# Patient Record
Sex: FEMALE | Race: WHITE | NOT HISPANIC OR LATINO | Employment: OTHER | URBAN - METROPOLITAN AREA
[De-identification: names, ages, dates, MRNs, and addresses within clinical notes are randomized per-mention and may not be internally consistent; named-entity substitution may affect disease eponyms.]

---

## 2017-03-29 ENCOUNTER — HISTORICAL (OUTPATIENT)
Dept: RADIOLOGY | Facility: HOSPITAL | Age: 72
End: 2017-03-29

## 2017-04-04 ENCOUNTER — HISTORICAL (OUTPATIENT)
Dept: RADIOLOGY | Facility: HOSPITAL | Age: 72
End: 2017-04-04

## 2017-12-05 ENCOUNTER — HISTORICAL (OUTPATIENT)
Dept: RADIOLOGY | Facility: HOSPITAL | Age: 72
End: 2017-12-05

## 2018-03-21 ENCOUNTER — HISTORICAL (OUTPATIENT)
Dept: RADIOLOGY | Facility: HOSPITAL | Age: 73
End: 2018-03-21

## 2018-03-21 LAB — PTH-INTACT SERPL-MCNC: 121.5 PG/ML (ref 18.4–80.1)

## 2018-04-06 ENCOUNTER — HISTORICAL (OUTPATIENT)
Dept: RADIOLOGY | Facility: HOSPITAL | Age: 73
End: 2018-04-06

## 2018-04-30 ENCOUNTER — HISTORICAL (OUTPATIENT)
Dept: SURGERY | Facility: HOSPITAL | Age: 73
End: 2018-04-30

## 2018-04-30 LAB
ABS NEUT (OLG): 3.7 X10(3)/MCL (ref 1.5–6.9)
ALBUMIN SERPL-MCNC: 4.2 GM/DL (ref 3.4–5)
ALBUMIN/GLOB SERPL: 1 RATIO
ALP SERPL-CCNC: 49 UNIT/L (ref 30–113)
ALT SERPL-CCNC: 30 UNIT/L (ref 10–45)
APTT PPP: 23.9 SECOND(S) (ref 25–35)
AST SERPL-CCNC: 20 UNIT/L (ref 15–37)
BILIRUB SERPL-MCNC: 0.5 MG/DL (ref 0.1–0.9)
BILIRUBIN DIRECT+TOT PNL SERPL-MCNC: 0.1 MG/DL (ref 0–0.3)
BILIRUBIN DIRECT+TOT PNL SERPL-MCNC: 0.4 MG/DL
BUN SERPL-MCNC: 21 MG/DL (ref 10–20)
CALCIUM SERPL-MCNC: 10.5 MG/DL (ref 8–10.5)
CHLORIDE SERPL-SCNC: 100 MMOL/L (ref 100–108)
CO2 SERPL-SCNC: 31 MMOL/L (ref 21–35)
CREAT SERPL-MCNC: 0.9 MG/DL (ref 0.7–1.3)
ERYTHROCYTE [DISTWIDTH] IN BLOOD BY AUTOMATED COUNT: 13.2 % (ref 11.5–17)
GLOBULIN SER-MCNC: 4.3 GM/DL
GLUCOSE SERPL-MCNC: 93 MG/DL (ref 75–116)
HCT VFR BLD AUTO: 37.1 % (ref 36–48)
HGB BLD-MCNC: 12.6 GM/DL (ref 12–16)
INR PPP: 1 (ref 0–1.2)
MCH RBC QN AUTO: 28 PG (ref 27–34)
MCHC RBC AUTO-ENTMCNC: 34 GM/DL (ref 31–36)
MCV RBC AUTO: 84 FL (ref 80–99)
PLATELET # BLD AUTO: 265 X10(3)/MCL (ref 140–400)
PMV BLD AUTO: 10.6 FL (ref 6.8–10)
POTASSIUM SERPL-SCNC: 3.6 MMOL/L (ref 3.6–5.2)
PROT SERPL-MCNC: 8.5 GM/DL (ref 6.4–8.2)
PROTHROMBIN TIME: 10.6 SECOND(S) (ref 9–12)
RBC # BLD AUTO: 4.42 X10(6)/MCL (ref 4.2–5.4)
SODIUM SERPL-SCNC: 140 MMOL/L (ref 135–145)
WBC # SPEC AUTO: 6.2 X10(3)/MCL (ref 4.5–11.5)

## 2018-05-03 ENCOUNTER — HISTORICAL (OUTPATIENT)
Dept: ANESTHESIOLOGY | Facility: HOSPITAL | Age: 73
End: 2018-05-03

## 2018-10-04 ENCOUNTER — HISTORICAL (OUTPATIENT)
Dept: RADIOLOGY | Facility: HOSPITAL | Age: 73
End: 2018-10-04

## 2018-10-15 ENCOUNTER — HISTORICAL (OUTPATIENT)
Dept: INFUSION THERAPY | Facility: HOSPITAL | Age: 73
End: 2018-10-15

## 2018-10-30 LAB — MAGNESIUM SERPL-MCNC: 1.3 MG/DL (ref 1.8–2.4)

## 2018-10-31 LAB — MAGNESIUM SERPL-MCNC: 1.4 MG/DL (ref 1.8–2.4)

## 2018-11-01 LAB
ABS NEUT (OLG): 7.86
BASOPHILS # BLD AUTO: 0.02 X10(3)/MCL
BASOPHILS NFR BLD AUTO: 0.2 %
BUN SERPL-MCNC: 14 MG/DL (ref 7–18)
CALCIUM SERPL-MCNC: 7.5 MG/DL (ref 8.5–10.1)
CHLORIDE SERPL-SCNC: 103 MMOL/L (ref 98–107)
CO2 SERPL-SCNC: 27.5 MMOL/L (ref 21–32)
CREAT SERPL-MCNC: 1.09 MG/DL (ref 0.55–1.02)
CREAT/UREA NIT SERPL: 13
EOSINOPHIL # BLD AUTO: 0.24 X10(3)/MCL
EOSINOPHIL NFR BLD AUTO: 2.4 %
ERYTHROCYTE [DISTWIDTH] IN BLOOD BY AUTOMATED COUNT: 14 %
GLUCOSE SERPL-MCNC: 116 MG/DL (ref 74–106)
HCT VFR BLD AUTO: 29.4 % (ref 34–46)
HGB BLD-MCNC: 10.1 GM/DL (ref 11.3–15.4)
IMM GRANULOCYTES # BLD AUTO: 0.07 10*3/UL (ref 0–0.1)
IMM GRANULOCYTES NFR BLD AUTO: 0.7 % (ref 0–1)
LYMPHOCYTES # BLD AUTO: 1.13 X10(3)/MCL
LYMPHOCYTES NFR BLD AUTO: 11.1 %
MAGNESIUM SERPL-MCNC: 1.8 MG/DL (ref 1.8–2.4)
MCH RBC QN AUTO: 28.5 PG (ref 27–33)
MCHC RBC AUTO-ENTMCNC: 34.4 GM/DL (ref 32–35)
MCV RBC AUTO: 82.8 FL (ref 81–97)
MONOCYTES # BLD AUTO: 0.84 X10(3)/MCL
MONOCYTES NFR BLD AUTO: 8.3 %
NEUTROPHILS # BLD AUTO: 7.86 X10(3)/MCL
NEUTROPHILS NFR BLD AUTO: 77.3 %
PLATELET # BLD AUTO: 308 X10(3)/MCL (ref 151–368)
PMV BLD AUTO: 9 FL
POTASSIUM SERPL-SCNC: 3.7 MMOL/L (ref 3.5–5.1)
RBC # BLD AUTO: 3.55 X10(6)/MCL (ref 3.9–5)
SODIUM SERPL-SCNC: 138 MMOL/L (ref 136–145)
WBC # SPEC AUTO: 10.16 X10(3)/MCL (ref 3.4–9.2)

## 2018-11-02 ENCOUNTER — HISTORICAL (OUTPATIENT)
Dept: ADMINISTRATIVE | Facility: HOSPITAL | Age: 73
End: 2018-11-02

## 2018-11-02 LAB
ABS NEUT (OLG): 6.86
BASOPHILS # BLD AUTO: 0.03 X10(3)/MCL
BASOPHILS NFR BLD AUTO: 0.3 %
BUN SERPL-MCNC: 13 MG/DL (ref 7–18)
CALCIUM SERPL-MCNC: 7.6 MG/DL (ref 8.5–10.1)
CHLORIDE SERPL-SCNC: 102 MMOL/L (ref 98–107)
CO2 SERPL-SCNC: 26.8 MMOL/L (ref 21–32)
CREAT SERPL-MCNC: 0.9 MG/DL (ref 0.55–1.02)
CREAT/UREA NIT SERPL: 14
EOSINOPHIL # BLD AUTO: 0.28 X10(3)/MCL
EOSINOPHIL NFR BLD AUTO: 3 %
ERYTHROCYTE [DISTWIDTH] IN BLOOD BY AUTOMATED COUNT: 14 %
GLUCOSE SERPL-MCNC: 107 MG/DL (ref 74–106)
HCT VFR BLD AUTO: 29.7 % (ref 34–46)
HGB BLD-MCNC: 10.2 GM/DL (ref 11.3–15.4)
IMM GRANULOCYTES # BLD AUTO: 0.06 10*3/UL (ref 0–0.1)
IMM GRANULOCYTES NFR BLD AUTO: 0.6 % (ref 0–1)
LYMPHOCYTES # BLD AUTO: 1.32 X10(3)/MCL
LYMPHOCYTES NFR BLD AUTO: 14.1 %
MAGNESIUM SERPL-MCNC: 1.6 MG/DL (ref 1.8–2.4)
MCH RBC QN AUTO: 28.4 PG (ref 27–33)
MCHC RBC AUTO-ENTMCNC: 34.3 GM/DL (ref 32–35)
MCV RBC AUTO: 82.7 FL (ref 81–97)
MONOCYTES # BLD AUTO: 0.82 X10(3)/MCL
MONOCYTES NFR BLD AUTO: 8.8 %
NEUTROPHILS # BLD AUTO: 6.86 X10(3)/MCL
NEUTROPHILS NFR BLD AUTO: 73.2 %
PLATELET # BLD AUTO: 311 X10(3)/MCL (ref 151–368)
PMV BLD AUTO: 9 FL
POTASSIUM SERPL-SCNC: 3.1 MMOL/L (ref 3.5–5.1)
RBC # BLD AUTO: 3.59 X10(6)/MCL (ref 3.9–5)
SODIUM SERPL-SCNC: 138 MMOL/L (ref 136–145)
WBC # SPEC AUTO: 9.37 X10(3)/MCL (ref 3.4–9.2)

## 2018-11-03 LAB
ABS NEUT (OLG): 5.57
BASOPHILS # BLD AUTO: 0.04 X10(3)/MCL
BASOPHILS NFR BLD AUTO: 0.5 %
BUN SERPL-MCNC: 12 MG/DL (ref 7–18)
CALCIUM SERPL-MCNC: 7.9 MG/DL (ref 8.5–10.1)
CHLORIDE SERPL-SCNC: 101 MMOL/L (ref 98–107)
CO2 SERPL-SCNC: 27.3 MMOL/L (ref 21–32)
CREAT SERPL-MCNC: 0.78 MG/DL (ref 0.55–1.02)
CREAT/UREA NIT SERPL: 15
EOSINOPHIL # BLD AUTO: 0.17 X10(3)/MCL
EOSINOPHIL NFR BLD AUTO: 2.2 %
ERYTHROCYTE [DISTWIDTH] IN BLOOD BY AUTOMATED COUNT: 14 %
GLUCOSE SERPL-MCNC: 104 MG/DL (ref 74–106)
HCT VFR BLD AUTO: 30.6 % (ref 34–46)
HGB BLD-MCNC: 10.5 GM/DL (ref 11.3–15.4)
IMM GRANULOCYTES # BLD AUTO: 0.05 10*3/UL (ref 0–0.1)
IMM GRANULOCYTES NFR BLD AUTO: 0.6 % (ref 0–1)
LYMPHOCYTES # BLD AUTO: 1.15 X10(3)/MCL
LYMPHOCYTES NFR BLD AUTO: 14.9 %
MAGNESIUM SERPL-MCNC: 2 MG/DL (ref 1.8–2.4)
MCH RBC QN AUTO: 28.3 PG (ref 27–33)
MCHC RBC AUTO-ENTMCNC: 34.3 GM/DL (ref 32–35)
MCV RBC AUTO: 82.5 FL (ref 81–97)
MONOCYTES # BLD AUTO: 0.74 X10(3)/MCL
MONOCYTES NFR BLD AUTO: 9.6 %
NEUTROPHILS # BLD AUTO: 5.57 X10(3)/MCL
NEUTROPHILS NFR BLD AUTO: 72.2 %
PLATELET # BLD AUTO: 298 X10(3)/MCL (ref 151–368)
PMV BLD AUTO: 10 FL
POTASSIUM SERPL-SCNC: 3.5 MMOL/L (ref 3.5–5.1)
RBC # BLD AUTO: 3.71 X10(6)/MCL (ref 3.9–5)
SODIUM SERPL-SCNC: 136 MMOL/L (ref 136–145)
WBC # SPEC AUTO: 7.72 X10(3)/MCL (ref 3.4–9.2)

## 2018-11-06 LAB
ABS NEUT (OLG): 4.15
BASOPHILS # BLD AUTO: 0.04 X10(3)/MCL
BASOPHILS NFR BLD AUTO: 0.7 %
BUN SERPL-MCNC: 23 MG/DL (ref 7–18)
CALCIUM SERPL-MCNC: 9.1 MG/DL (ref 8.5–10.1)
CHLORIDE SERPL-SCNC: 100 MMOL/L (ref 98–107)
CO2 SERPL-SCNC: 27.4 MMOL/L (ref 21–32)
CREAT SERPL-MCNC: 0.88 MG/DL (ref 0.55–1.02)
CREAT/UREA NIT SERPL: 26
EOSINOPHIL # BLD AUTO: 0.11 X10(3)/MCL
EOSINOPHIL NFR BLD AUTO: 1.8 %
ERYTHROCYTE [DISTWIDTH] IN BLOOD BY AUTOMATED COUNT: 14 %
GLUCOSE SERPL-MCNC: 112 MG/DL (ref 74–106)
HCT VFR BLD AUTO: 30.8 % (ref 34–46)
HGB BLD-MCNC: 10.5 GM/DL (ref 11.3–15.4)
IMM GRANULOCYTES # BLD AUTO: 0.02 10*3/UL (ref 0–0.1)
IMM GRANULOCYTES NFR BLD AUTO: 0.3 % (ref 0–1)
LYMPHOCYTES # BLD AUTO: 0.94 X10(3)/MCL
LYMPHOCYTES NFR BLD AUTO: 15.7 %
MAGNESIUM SERPL-MCNC: 1.5 MG/DL (ref 1.8–2.4)
MCH RBC QN AUTO: 28.1 PG (ref 27–33)
MCHC RBC AUTO-ENTMCNC: 34.1 GM/DL (ref 32–35)
MCV RBC AUTO: 82.4 FL (ref 81–97)
MONOCYTES # BLD AUTO: 0.73 X10(3)/MCL
MONOCYTES NFR BLD AUTO: 12.2 %
NEUTROPHILS # BLD AUTO: 4.15 X10(3)/MCL
NEUTROPHILS NFR BLD AUTO: 69.3 %
PLATELET # BLD AUTO: 292 X10(3)/MCL (ref 151–368)
PMV BLD AUTO: 10 FL
POTASSIUM SERPL-SCNC: 3.4 MMOL/L (ref 3.5–5.1)
RBC # BLD AUTO: 3.74 X10(6)/MCL (ref 3.9–5)
SODIUM SERPL-SCNC: 137 MMOL/L (ref 136–145)
WBC # SPEC AUTO: 5.99 X10(3)/MCL (ref 3.4–9.2)

## 2018-11-07 LAB
ABS NEUT (OLG): 2.83
BUN SERPL-MCNC: 22 MG/DL (ref 7–18)
CALCIUM SERPL-MCNC: 9.9 MG/DL (ref 8.5–10.1)
CHLORIDE SERPL-SCNC: 99 MMOL/L (ref 98–107)
CO2 SERPL-SCNC: 30 MMOL/L (ref 21–32)
CREAT SERPL-MCNC: 1.05 MG/DL (ref 0.55–1.02)
CREAT/UREA NIT SERPL: 21
EOSINOPHIL NFR BLD MANUAL: 1 % (ref 0–8)
ERYTHROCYTE [DISTWIDTH] IN BLOOD BY AUTOMATED COUNT: 14 %
GLUCOSE SERPL-MCNC: 111 MG/DL (ref 74–106)
GRANULOCYTES NFR BLD MANUAL: 65 % (ref 47–80)
HCT VFR BLD AUTO: 30.9 % (ref 34–46)
HGB BLD-MCNC: 10.6 GM/DL (ref 11.3–15.4)
LYMPHOCYTES NFR BLD MANUAL: 25 % (ref 13–40)
MAGNESIUM SERPL-MCNC: 1.4 MG/DL (ref 1.8–2.4)
MCH RBC QN AUTO: 28.1 PG (ref 27–33)
MCHC RBC AUTO-ENTMCNC: 34.3 GM/DL (ref 32–35)
MCV RBC AUTO: 82 FL (ref 81–97)
MONOCYTES NFR BLD MANUAL: 9 % (ref 2–11)
PLATELET # BLD AUTO: 277 X10(3)/MCL (ref 151–368)
PLATELET # BLD EST: ADEQUATE 10*3/UL
PMV BLD AUTO: 10 FL
POTASSIUM SERPL-SCNC: 3.8 MMOL/L (ref 3.5–5.1)
RBC # BLD AUTO: 3.77 X10(6)/MCL (ref 3.9–5)
SODIUM SERPL-SCNC: 136 MMOL/L (ref 136–145)
WBC # SPEC AUTO: 4.69 X10(3)/MCL (ref 3.4–9.2)

## 2018-11-08 LAB
ABS NEUT (OLG): 1.89
BUN SERPL-MCNC: 25 MG/DL (ref 7–18)
CALCIUM SERPL-MCNC: 9.7 MG/DL (ref 8.5–10.1)
CHLORIDE SERPL-SCNC: 101 MMOL/L (ref 98–107)
CO2 SERPL-SCNC: 31.9 MMOL/L (ref 21–32)
CREAT SERPL-MCNC: 0.88 MG/DL (ref 0.55–1.02)
CREAT/UREA NIT SERPL: 28
EOSINOPHIL NFR BLD MANUAL: 2 % (ref 0–8)
ERYTHROCYTE [DISTWIDTH] IN BLOOD BY AUTOMATED COUNT: 14 %
GLUCOSE SERPL-MCNC: 106 MG/DL (ref 74–106)
GRANULOCYTES NFR BLD MANUAL: 64 % (ref 47–80)
HCT VFR BLD AUTO: 30.6 % (ref 34–46)
HGB BLD-MCNC: 10.4 GM/DL (ref 11.3–15.4)
LYMPHOCYTES NFR BLD MANUAL: 24 % (ref 13–40)
MAGNESIUM SERPL-MCNC: 1.3 MG/DL (ref 1.8–2.4)
MCH RBC QN AUTO: 27.9 PG (ref 27–33)
MCHC RBC AUTO-ENTMCNC: 34 GM/DL (ref 32–35)
MCV RBC AUTO: 82 FL (ref 81–97)
MONOCYTES NFR BLD MANUAL: 10 % (ref 2–11)
PLATELET # BLD AUTO: 279 X10(3)/MCL (ref 151–368)
PLATELET # BLD EST: ADEQUATE 10*3/UL
PMV BLD AUTO: 10 FL
POTASSIUM SERPL-SCNC: 3.8 MMOL/L (ref 3.5–5.1)
RBC # BLD AUTO: 3.73 X10(6)/MCL (ref 3.9–5)
SODIUM SERPL-SCNC: 138 MMOL/L (ref 136–145)
WBC # SPEC AUTO: 4 X10(3)/MCL (ref 3.4–9.2)

## 2018-12-27 ENCOUNTER — HISTORICAL (OUTPATIENT)
Dept: LAB | Facility: HOSPITAL | Age: 73
End: 2018-12-27

## 2018-12-27 LAB
FLUAV AG NPH QL IA: POSITIVE
FLUBV AG NPH QL IA: NEGATIVE

## 2019-04-16 ENCOUNTER — HISTORICAL (OUTPATIENT)
Dept: INFUSION THERAPY | Facility: HOSPITAL | Age: 74
End: 2019-04-16

## 2019-10-10 ENCOUNTER — HISTORICAL (OUTPATIENT)
Dept: RADIOLOGY | Facility: HOSPITAL | Age: 74
End: 2019-10-10

## 2019-10-16 ENCOUNTER — HISTORICAL (OUTPATIENT)
Dept: INFUSION THERAPY | Facility: HOSPITAL | Age: 74
End: 2019-10-16

## 2019-12-26 ENCOUNTER — HISTORICAL (OUTPATIENT)
Dept: ADMINISTRATIVE | Facility: HOSPITAL | Age: 74
End: 2019-12-26

## 2019-12-26 LAB
APPEARANCE, UA: CLEAR
BILIRUB UR QL STRIP: NEGATIVE
COLOR UR: YELLOW
GLUCOSE (UA): NEGATIVE
HGB UR QL STRIP: NEGATIVE
KETONES UR QL STRIP: NEGATIVE
LEUKOCYTE ESTERASE UR QL STRIP: NEGATIVE
NITRITE UR QL STRIP: NEGATIVE
PH UR STRIP: 6 [PH]
PROT UR QL STRIP: NEGATIVE
SP GR UR STRIP: 1.02
UROBILINOGEN UR STRIP-ACNC: 0.2 EU/DL

## 2020-09-16 ENCOUNTER — HISTORICAL (OUTPATIENT)
Dept: ADMINISTRATIVE | Facility: HOSPITAL | Age: 75
End: 2020-09-16

## 2020-09-16 LAB — LACTATE SERPL-SCNC: 1 MMOL/L (ref 0.5–2.2)

## 2020-09-25 ENCOUNTER — HISTORICAL (OUTPATIENT)
Dept: INFUSION THERAPY | Facility: HOSPITAL | Age: 75
End: 2020-09-25

## 2020-10-12 ENCOUNTER — HISTORICAL (OUTPATIENT)
Dept: RADIOLOGY | Facility: HOSPITAL | Age: 75
End: 2020-10-12

## 2021-03-26 ENCOUNTER — HISTORICAL (OUTPATIENT)
Dept: INFUSION THERAPY | Facility: HOSPITAL | Age: 76
End: 2021-03-26

## 2021-09-18 ENCOUNTER — HISTORICAL (OUTPATIENT)
Dept: ADMINISTRATIVE | Facility: HOSPITAL | Age: 76
End: 2021-09-18

## 2021-09-18 LAB
ABS NEUT (OLG): 8.9 X10(3)/MCL (ref 1.5–6.9)
ALBUMIN SERPL-MCNC: 3.9 GM/DL (ref 3.4–4.8)
ALBUMIN/GLOB SERPL: 1 RATIO (ref 1.1–2)
ALP SERPL-CCNC: 59 UNIT/L (ref 40–150)
ALT SERPL-CCNC: 23 UNIT/L (ref 0–55)
AST SERPL-CCNC: 20 UNIT/L (ref 5–34)
BILIRUB SERPL-MCNC: 0.6 MG/DL
BILIRUBIN DIRECT+TOT PNL SERPL-MCNC: 0.3 MG/DL (ref 0–0.5)
BILIRUBIN DIRECT+TOT PNL SERPL-MCNC: 0.3 MG/DL (ref 0–0.8)
BUN SERPL-MCNC: 41 MG/DL (ref 9.8–20.1)
CALCIUM SERPL-MCNC: 11 MG/DL (ref 8.4–10.2)
CHLORIDE SERPL-SCNC: 101 MMOL/L (ref 98–107)
CO2 SERPL-SCNC: 23 MMOL/L (ref 23–31)
CREAT SERPL-MCNC: 0.95 MG/DL (ref 0.55–1.02)
ERYTHROCYTE [DISTWIDTH] IN BLOOD BY AUTOMATED COUNT: 15.6 % (ref 11.5–17)
GLOBULIN SER-MCNC: 3.9 GM/DL (ref 2.4–3.5)
GLUCOSE SERPL-MCNC: 99 MG/DL (ref 82–115)
HCT VFR BLD AUTO: 33.3 % (ref 36–48)
HGB BLD-MCNC: 11.3 GM/DL (ref 12–16)
MCH RBC QN AUTO: 28 PG (ref 27–34)
MCHC RBC AUTO-ENTMCNC: 34 GM/DL (ref 31–36)
MCV RBC AUTO: 81 FL (ref 80–99)
PLATELET # BLD AUTO: 230 X10(3)/MCL (ref 140–400)
PMV BLD AUTO: 10 FL (ref 6.8–10)
POTASSIUM SERPL-SCNC: 4.3 MMOL/L (ref 3.5–5.1)
PROT SERPL-MCNC: 7.8 GM/DL (ref 5.8–7.6)
RBC # BLD AUTO: 4.1 X10(6)/MCL (ref 4.2–5.4)
SODIUM SERPL-SCNC: 140 MMOL/L (ref 136–145)
WBC # SPEC AUTO: 11.4 X10(3)/MCL (ref 4.5–11.5)

## 2021-09-23 ENCOUNTER — HISTORICAL (OUTPATIENT)
Dept: INFUSION THERAPY | Facility: HOSPITAL | Age: 76
End: 2021-09-23

## 2022-03-25 ENCOUNTER — HISTORICAL (OUTPATIENT)
Dept: RADIOLOGY | Facility: HOSPITAL | Age: 77
End: 2022-03-25

## 2022-03-25 ENCOUNTER — HISTORICAL (OUTPATIENT)
Dept: ADMINISTRATIVE | Facility: HOSPITAL | Age: 77
End: 2022-03-25

## 2022-04-14 ENCOUNTER — HISTORICAL (OUTPATIENT)
Dept: INFUSION THERAPY | Facility: HOSPITAL | Age: 77
End: 2022-04-14

## 2022-04-30 NOTE — OP NOTE
PROCEDURE:  Colonoscopy.    PREOPERATIVE DIAGNOSIS:  Fecal abnormality (positive Cologuard test).    POSTOPERATIVE DIAGNOSES:    1. A 2-3 mm polyp of the proximal transverse colon, hot biopsy removal piecemeal.  2. Hot snare polypectomy at 40 cm of the distal descending.    HISTORY OF PRESENT ILLNESS:  A 73-year-old white female with a positive Cologuard.  She denies any other alarming symptoms.  We are pursuing a Cologuard considering its findings.     She was consented for the procedure with her nephew, Mr. __________, who assists her.  Otherwise, she was consentable at the time.  Note his witness to that as well as my nurse, Angy.  She was agreeable to the procedure and was willing to undergo the risks and benefits of the procedure.  Those were explained to her in my office.    PROCEDURE IN DETAIL:  She was brought down to the endoscopy room suite.  At that point, the patient relayed to the endoscopy team, that she was unable to lay on her left side long because of severe crystal induced dizziness.  I told her that we would use propofol, which would prevent any neuro proprioceptive capacity and we explained this to her and she was amenable to that.  She was placed on her right side initially.  Anesthesia was given per the IV of propofol.  Please see their records.  She was turned to her left lateral decubitus and we began the case.     Rectal exam was performed.  She had no internal and no external abnormalities.  The scope was navigated all the way to the right-sided colon.       She had a suboptimal prep.  I was able to see the cecum and the terminal ileal orifice, but I could not intubate it due to the laxity of her colon, but again, the prep was suboptimal, but I could see that she had no obvious masses on the right side.  Of note, she was complaining of right-sided pain.  There were no diverticular issues.  No signs of any mucosal changes on the right side.  Then, 360 degree circumferential views  were taken of the right-side in the hepatic flexure and transverse colon.     In the transverse colon, there was a small 2-3 mm polyp that I removed with a hot snare.  Electrocautery setting of 6.  No perforation, no bleed.  Sent off in jar #1.     The rest of the transverse colon was within normal limits.  No mucosal changes.  No masses.  No polyps.  The same applies to the splenic flexure.     The descending colon, at 40 cm, showed a small 0.75 mm polyp that was amenable to hot snare and removal.  It was sent off in jar #2.  No perforation.  No bleed.  Electrocautery setting of 6.     The scope was pulled down.  The rest of the sigmoid and rectum in retroflexion showed no internal abnormalities.     In summary, a 73-year-old white female with hot biopsy piecemeal removal of a subcentimeter polyp in the proximal transverse.     Hot snare polypectomy of a subcentimeter polyp at the distal descending.     We will follow up on these biopsies closely and I would discuss with Dr. Vargas other etiologies to her right upper quadrant pain (duodenal, gastric, gallbladder) if she still has that, etc.  Also, would consider costal pain as well because she also can be constipated at times, which is very possible, considering tortuosity of her colon.  I will try cathartics and then if we were to pursue upper endoscopy as a last resort.     I will follow along with Dr. Vargas.  We will discuss the case as needed.       Thank you note to her for allowing me to participate in the care of her patients as usual.        JOSE DANIEL/GENOVEVA   DD: 05/03/2018 1119   DT: 05/03/2018 1147  Job # [id number]    cc: MERARY Almodovar III, M.D.

## 2022-05-03 NOTE — HISTORICAL [facility] CERNER
This is a historical note converted from Andres. Formatting and pictures may have been removed.  Please reference Andres for original formatting and attached multimedia. History of Present Illness  74yo female admitted to [facility] for therapy and rehab.? She was recently at [facility] where she was admitted for UTI, colitis, weakness and falls.? The family member that she lives with requested SNF placement.? She was not able to go to [facility] yet due to a level 2.? Otherwise she is well.? She is c/o some nausea today but no vomiting.? She will be resumed on her current hospital meds and we will get PT/OT involved in her care.  Review of Systems  ?  ?????Constitutional: ?No fever, No chills, No sweats,?+ weakness, No fatigue. ?  ?????Eye: ?No double vision, No dry eyes, No photophobia. ?  ?????Ear/Nose/Mouth/Throat: ?No ear pain, No nasal congestion, No sore throat. ?  ?????Respiratory: ?No shortness of breath, No cough, No sputum production, No hemoptysis, No wheezing, No pleuritic pain. ?  ?????Cardiovascular: ?No chest pain, No palpitations, No peripheral edema, No syncope. ?  ?????Gastrointestinal:??+ nausea, No vomiting, No diarrhea, No constipation, No heartburn, No abdominal pain. ?  ?????Genitourinary: ?No dysuria, No hematuria, No change in urine stream. ?  ?????Hematology/Lymphatics: ?No anemia, No bruising tendency, No bruise, No hemorrhage, No petechiae. ?  ?????Endocrine: ?No excessive thirst, No polyuria, No cold intolerance. ?  ?????Immunologic: ?No recurrent fevers, No recurrent infections. ?  ?????Musculoskeletal: ?Joint pain,?+ back pain, No muscle pain, No decreased range of motion. ?  ?????Integumentary: ?No rash, No pruritus, No petechiae, No skin lesion. ?  ?????Neurologic: ?Alert and oriented X4, No abnormal balance,?+ confusion, No tingling. ?  ?????Psychiatric: ?No anxiety, No depression. ?  Physical Exam  Vitals & Measurements  T:?36.7? ?C (Oral)? TMIN:?36.5? ?C (Oral)? TMAX:?36.9? ?C  (Oral)? HR:?82(Peripheral)? RR:?18? BP:?114/74? SpO2:?97%? WT:?91?kg? WT:?91?kg?  General: ?Alert and oriented, No acute distress. ?  ??????? ? Appearance: Well nourished. ?  ??????? ? Behavior: Appropriate. ?  ??????? ? Skin: Normal for ethnicity. ?  ?????Eye: ?Pupils are equal, round and reactive to light, Extraocular movements are intact. ?  ?????HENT: ?Normocephalic, Normal hearing, Oral mucosa is moist, No pharyngeal erythema. ?  ?????Neck: ?Supple, Non-tender, No carotid bruit, No jugular venous distention, No lymphadenopathy, No thyromegaly. ?  ?????Respiratory: ?Lungs are clear to auscultation, Breath sounds are equal, No chest wall tenderness. ?  ?????Cardiovascular: ?Normal rate, Regular rhythm, No murmur, No gallop, Good pulses equal in all extremities, Normal peripheral perfusion, No edema. ?  ?????Gastrointestinal: ?Soft, Non-distended, Normal bowel sounds, No organomegaly. ?obese, mild TTP which is diffuse  ?????Genitourinary: ?No costovertebral angle tenderness, No urethral discharge. ?  ?????Lymphatics: ?No lymphadenopathy neck, axilla, groin. ?  ?????Musculoskeletal: ?Normal range of motion, Normal strength, No tenderness, No swelling, Normal gait. ?  ?????Integumentary: ?Warm, Dry, Pink, Intact, No rash. ?  ?????Neurologic: ?Alert, Oriented, Normal sensory, Normal motor function, No focal deficits, Cranial Nerves II-XII are grossly intact. ?  ?????Psychiatric: ?Cooperative, Appropriate mood & affect, Normal judgment. ?  Assessment/Plan  1.?UTI - Urinary tract infection  2.?HTN - Hypertension  3.?Hyperlipidemia  4.?Colitis  Orders:  clonazePAM, 0.25 mg, form: Tab, Oral, Daily, first dose 10/31/18 9:00:00 CDT  clonazePAM, 0.5 mg, form: Tab, Oral, qPM, first dose 10/31/18 21:00:00 CDT  DULoxetine, 60 mg, form: Cap-DR, Oral, qPM, first dose 10/31/18 21:00:00 CDT  enoxaparin, 30 mg, form: Injection, Subcutaneous, Once a day (at bedtime), first dose 10/31/18 21:00:00 CDT  hydrochlorothiazide, 25 mg,  form: Tab, Oral, qAM, first dose 10/31/18 9:00:00 CDT  levoFLOXacin, 750 mg, form: Infusion, IV Piggyback, q48hr, Infuse over: 1.5 hr, first dose 10/31/18 9:00:00 CDT  losartan, 25 mg, form: Tab, Oral, Daily, first dose 10/31/18 9:00:00 CDT  magnesium sulfate, 2 gm, form: Infusion, IV Piggyback, Once, Infuse over: 2 hr, first dose 10/31/18 9:43:00 CDT, stop date 10/31/18 9:43:00 CDT  metroNIDAZOLE, 500 mg, form: Infusion, IV Piggyback, q8hr, Infuse over: 1 hr, first dose 10/31/18 9:00:00 CDT  ondansetron, 4 mg, form: Injection, IV Push, q4hr PRN for nausea, first dose 10/31/18 13:16:00 CDT  pantoprazole, 40 mg, form: Tab-EC, Oral, Daily, first dose 10/31/18 9:00:00 CDT  potassium chloride, 20 mEq, form: Tab-ER, Oral, Daily, first dose 10/31/18 9:00:00 CDT  primidone, 25 mg, form: Tab, Oral, BID, first dose 10/31/18 9:00:00 CDT  QUEtiapine, 12.5 mg, form: Tab, Oral, At Bedtime, first dose 10/31/18 21:00:00 CDT  simvastatin, 40 mg, form: Tab, Oral, qPM, first dose 10/31/18 21:00:00 CDT  traMADol, 50 mg, form: Tab, Oral, q6hr PRN for pain, first dose 10/31/18 8:42:00 CDT  [facility]  [facility]  DVT prophylaxis  resume current transfer meds  replace Mag IV  consult PT/OT   Problem List/Past Medical History  Ongoing  Back pain  Depression  HTN - Hypertension  Hyperlipidemia  Obesity  UTI - Urinary tract infection  Historical  No qualifying data  Procedure/Surgical History  Biopsy Gastrointestinal (05/03/2018)  Colonoscopy (05/03/2018)  Polypectomy (05/03/2018)  Cholecystectomy Laparoscopic (None) (06/15/2016)  Appendectomy;  Colonoscopy, flexible; with biopsy, single or multiple  Total abdominal hysterectomy (corpus and cervix), with or without removal of tube(s), with or without removal of ovary(s);   Medications  Inpatient  clonazePAM, 0.25 mg= 0.5 tab(s), Oral, Daily  clonazePAM, 0.5 mg= 1 tab(s), Oral, qPM  DULoxetine 60 mg oral delayed release capsule, 60 mg= 1 cap(s), Oral, qPM  hydrochlorothiazide 25 mg  oral tablet, 25 mg= 1 tab(s), Oral, qAM  levofloxacin IVPB ( Levaquin ), 750 mg= 150 mL, IV Piggyback, q48hr  losartan 50 mg oral tablet, 25 mg= 0.5 tab(s), Oral, Daily  Lovenox 30 mg/0.3 mL subcutaneous solution, 30 mg= 0.3 mL, Subcutaneous, Once a day (at bedtime)  Magnesium Sulfate 2gm/50ml IVPB (Premix), 2 gm= 50 mL, IV Piggyback, Once  Metronidazole 500 mg / 100 ml premix, 500 mg= 100 mL, IV Piggyback, q8hr  Mysoline 50 mg oral tablet, 25 mg= 0.5 tab(s), Oral, BID  potassium chloride, 20 mEq= 2 tab(s), Oral, Daily  Protonix 40 mg ORAL enteric coated tablet, 40 mg= 1 tab(s), Oral, Daily  Seroquel 25 mg oral tablet, 12.5 mg= 0.5 tab(s), Oral, At Bedtime  simvastatin 40 mg oral tablet, 40 mg= 1 tab(s), Oral, qPM  traMADol, 50 mg= 1 tab(s), Oral, q6hr, PRN  Zofran 2 mg/mL injectable solution, 4 mg= 2 mL, IV Push, q4hr, PRN  Home  clonazePAM, 0.25 mg, Oral, Daily  CLONAZEPAM TAB 0.5MG, 0.5 mg= 1 tab(s), Oral, qPM  DULOXETINE CAP 60MG, 60 mg= 1 cap(s), Oral, qPM  HYDROCHLOROT TAB 25MG, 25 mg= 1 tab(s), Oral, qAM  levofloxacin 500 mg oral tablet, 500 mg= 1 tab(s), Oral, q24hr  levofloxacin vial ( Levaquin ), 750 mg, IV Piggyback, q48hr  losartan 25 mg oral tablet, 25 mg= 1 tab(s), Oral, Daily  Lovenox 30 mg/0.3 mL subcutaneous solution, 30 mg, Subcutaneous, Once a day (at bedtime)  metroNIDAZOLE, 500 mg, IV Piggyback, q8hr  Mysoline 50 mg oral tablet, 25 mg= 0.5 tab(s), Oral, BID  potassium chloride 20 mEq oral TABLET extended release, 20 mEq= 1 tab(s), Oral, Daily  Protonix 40 mg ORAL enteric coated tablet, 40 mg= 1 tab(s), Oral, Daily  Seroquel 25 mg oral tablet, 12.5 mg= 0.5 tab(s), Oral, At Bedtime  SIMVASTATIN TAB 40MG, 40 mg= 1 tab(s), Oral, qPM  traMADol 50 mg oral tablet, 50 mg= 1 tab(s), Oral, q6hr, PRN  Allergies  No Known Allergies  Social History  Alcohol  Never, 06/13/2016  Employment/School  Retired, 04/30/2018  Home/Environment  Lives with nephew., 04/30/2018  Lives with Significant other. Alcohol abuse  in household: No. Substance abuse in household: No. Smoker in household: No. Injuries/Abuse/Neglect in household: No., 06/15/2016  Lives with Alone. Living situation: Home/Independent. Alcohol abuse in household: No. Substance abuse in household: No. Smoker in household: No. Injuries/Abuse/Neglect in household: No. Feels unsafe at home: No. Safe place to go: No. Agency(s)/Others notified: No. Family/Friends available for support: Yes. Concern for family members at home: No. Major illness in household: No. Financial concerns: No., 06/13/2016  Nutrition/Health  Regular, 04/30/2018  Sexual  Sexual orientation: Heterosexual., 04/30/2018  Substance Abuse  Never, 06/13/2016  Tobacco  Never smoker, No, 10/30/2018  Never smoker, N/A, 10/25/2018  Never smoker, 06/13/2016  Family History  Heart disease 09-AUG-2016 15:25:48<$>: Father.  Stroke: Father.

## 2022-05-03 NOTE — HISTORICAL [facility] CERNER
This is a historical note converted from Andres. Formatting and pictures may have been removed.  Please reference Andres for original formatting and attached multimedia. Hospital Course  73-year-old female?admitted to swing bed for rehab secondary to?weakness with fall?secondary to UTI.? She was supposed to be admitted to [facility] but was transferred to swing bed while awaiting placement. ?Her clinical course was uncomplicated?as she continued her antibiotics?and continue to tolerate her physical therapy. ?She did complain of abdominal pain during her stay but the x-ray was negative for any acute pathology.? Her symptoms improved after having a bowel movement. ?She was approved?for nursing home?placement?and she was subsequently discharged in stable condition.  Significant Findings  (11/08/2018 12:28 CST XR Abdomen Flat and Erect)  Reason For Exam  Abdominal Pain  ?  Radiology Report  Abdomen 2 views  ?  Clinical history is abdominal pain  ?  This is compared to a previous study from 11/2/2018  ?  Bowel gas pattern is unremarkable. There are clips from previous  cholecystectomy. No free air is noted. No masses are seen. There is  scoliosis present.  ?  IMPRESSION: No acute disease is seen.  ? [1]  Physical Exam  Vitals & Measurements  BP: 136/84?temp: 36.9 ?C?pulse: 79?RR: 20?SpO2: 98% on room air  GEN: WDWN female,?in no acute distress,?alert, laying in bed.  EYES:? EOMI, No scleral icterus  HENT:? NCAT, Oral mucosa moist, Neck supple  CV:? Regular rate and rhythm, No murmur, rub, or gallop appreciated. Peripheral pulses palpable.  RESP:? Unlabored breathing, symmetrical chest rise and fall, CTA bilaterally, no wheeze, rhonchi, or crackles  GI: Bowel sounds normoactive, soft, flat, non-tender, improvement?in abdominal distention?from admission, No hepatosplenomegaly  :? No suprapubic tenderness to palpation  MSK/Extremities: No gross deformities. Moves all extremities well  NEURO:? AAOx3, CN 2-12 grossly intact,  Strength grossly intact, sensation grossly intact  SKIN:? Warm, dry, no ecchymoses or rash  PSYCH:? Mood and affect appropriate, good judgement, cooperative with exam  ?  Discharge time:?17 minutes  Discharge Plan  1.?UTI - Urinary tract infection?N39.0  2.?HTN - Hypertension?I10  3.?Hyperlipidemia?E78.5  4.?Colitis?K52.9  Patient Discharge Condition  Stable  Discharge Disposition  Skilled nursing facility  [1]?XR Abdomen Flat and Erect; Ap PULIDO, Nba BAILEY 11/08/2018 12:28 CST

## 2023-01-23 ENCOUNTER — LAB REQUISITION (OUTPATIENT)
Dept: LAB | Facility: HOSPITAL | Age: 78
End: 2023-01-23
Attending: FAMILY MEDICINE
Payer: MEDICARE

## 2023-01-23 DIAGNOSIS — U07.1 COVID-19: ICD-10-CM

## 2023-01-23 LAB — SARS-COV-2 RNA RESP QL NAA+PROBE: DETECTED

## 2023-01-23 PROCEDURE — 87635 SARS-COV-2 COVID-19 AMP PRB: CPT | Performed by: FAMILY MEDICINE

## 2023-08-25 ENCOUNTER — LAB REQUISITION (OUTPATIENT)
Dept: LAB | Facility: HOSPITAL | Age: 78
End: 2023-08-25
Payer: MEDICARE

## 2023-08-25 DIAGNOSIS — N39.0 URINARY TRACT INFECTION, SITE NOT SPECIFIED: ICD-10-CM

## 2023-08-25 LAB
APPEARANCE UR: CLEAR
BACTERIA #/AREA URNS AUTO: ABNORMAL /HPF
BILIRUB UR QL STRIP.AUTO: NEGATIVE
COLOR UR: YELLOW
GLUCOSE UR QL STRIP.AUTO: NEGATIVE
KETONES UR QL STRIP.AUTO: ABNORMAL
LEUKOCYTE ESTERASE UR QL STRIP.AUTO: NEGATIVE
MUCOUS THREADS URNS QL MICRO: ABNORMAL /LPF
NITRITE UR QL STRIP.AUTO: NEGATIVE
PH UR STRIP.AUTO: 5 [PH]
PROT UR QL STRIP.AUTO: ABNORMAL
RBC #/AREA URNS AUTO: ABNORMAL /HPF
RBC UR QL AUTO: NEGATIVE
SP GR UR STRIP.AUTO: 1.01
SQUAMOUS #/AREA URNS AUTO: ABNORMAL /HPF
UROBILINOGEN UR STRIP-ACNC: 0.2
WBC #/AREA URNS AUTO: ABNORMAL /HPF

## 2023-08-25 PROCEDURE — 87088 URINE BACTERIA CULTURE: CPT | Performed by: FAMILY MEDICINE

## 2023-08-25 PROCEDURE — 81001 URINALYSIS AUTO W/SCOPE: CPT | Performed by: FAMILY MEDICINE

## 2023-08-28 LAB — BACTERIA UR CULT: NO GROWTH

## 2023-09-11 ENCOUNTER — HOSPITAL ENCOUNTER (EMERGENCY)
Facility: HOSPITAL | Age: 78
Discharge: HOME | End: 2023-09-11
Attending: INTERNAL MEDICINE
Payer: MEDICARE

## 2023-09-11 VITALS
TEMPERATURE: 98 F | HEIGHT: 60 IN | BODY MASS INDEX: 38.68 KG/M2 | OXYGEN SATURATION: 95 % | SYSTOLIC BLOOD PRESSURE: 135 MMHG | RESPIRATION RATE: 20 BRPM | DIASTOLIC BLOOD PRESSURE: 66 MMHG | HEART RATE: 90 BPM | WEIGHT: 197 LBS

## 2023-09-11 DIAGNOSIS — S01.83XA PUNCTURE WOUND OF FOREHEAD, INITIAL ENCOUNTER: Primary | ICD-10-CM
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
## 2023-09-11 PROBLEM — M54.9 BACK PAIN: Status: ACTIVE | Noted: 2023-09-11

## 2023-09-11 PROBLEM — I10 HYPERTENSION: Status: ACTIVE | Noted: 2023-09-11

## 2023-09-11 PROBLEM — C82.80: Status: ACTIVE | Noted: 2017-12-01

## 2023-09-11 PROBLEM — F32.A DEPRESSIVE DISORDER: Status: ACTIVE | Noted: 2023-09-11

## 2023-09-11 PROBLEM — E78.5 HYPERLIPIDEMIA: Status: ACTIVE | Noted: 2023-09-11

## 2023-09-11 PROCEDURE — 99284 EMERGENCY DEPT VISIT MOD MDM: CPT | Mod: 25

## 2023-09-11 RX ORDER — DULOXETIN HYDROCHLORIDE 60 MG/1
60 CAPSULE, DELAYED RELEASE ORAL NIGHTLY
COMMUNITY
Start: 2023-08-22

## 2023-09-11 RX ORDER — HYDROCHLOROTHIAZIDE 25 MG/1
25 TABLET ORAL DAILY
COMMUNITY
Start: 2023-08-16

## 2023-09-11 RX ORDER — LOSARTAN POTASSIUM 50 MG/1
50 TABLET ORAL DAILY
COMMUNITY
Start: 2023-09-08

## 2023-09-11 RX ORDER — PRIMIDONE 50 MG/1
25 TABLET ORAL NIGHTLY
COMMUNITY
Start: 2023-08-22

## 2023-09-11 RX ORDER — LINACLOTIDE 145 UG/1
145 CAPSULE, GELATIN COATED ORAL DAILY
COMMUNITY
Start: 2023-08-23

## 2023-09-11 RX ORDER — ARIPIPRAZOLE 5 MG/1
5 TABLET ORAL DAILY
COMMUNITY
Start: 2023-09-07

## 2023-09-11 RX ORDER — SIMVASTATIN 40 MG/1
40 TABLET, FILM COATED ORAL NIGHTLY
COMMUNITY
Start: 2023-08-28

## 2023-09-11 RX ORDER — PANTOPRAZOLE SODIUM 40 MG/1
40 TABLET, DELAYED RELEASE ORAL DAILY
COMMUNITY
Start: 2023-07-31

## 2023-09-11 RX ORDER — SPIRONOLACTONE 25 MG/1
25 TABLET ORAL DAILY
COMMUNITY
Start: 2023-09-08

## 2023-09-11 RX ORDER — CLONAZEPAM 0.5 MG/1
0.5 TABLET ORAL 2 TIMES DAILY PRN
COMMUNITY
Start: 2023-08-22

## 2023-09-11 NOTE — ED PROVIDER NOTES
Encounter Date: 9/11/2023  History from medics, history limited due to patient's dementia and inability to maintain fluent conversation     History     Chief Complaint   Patient presents with    Fall     Pt brought in by [facility] from [facility] with c/o fall. Pt has laceration to forehead.     HPI    78 y.o. female  has a past medical history of Anxiety disorder, unspecified, Dementia, Diverticulosis, Essential tremor, GERD (gastroesophageal reflux disease), Hypertension, Major depressive disorder, recurrent, severe with psychotic symptoms, Mild cognitive impairment, and Mixed hyperlipidemia.  Fall (Pt brought in by [facility] from [facility] with c/o fall. Pt has laceration to forehead.)     Review of patient's allergies indicates:   Allergen Reactions    Azithromycin      Other reaction(s): Unknown     Past Medical History:   Diagnosis Date    Anxiety disorder, unspecified     Dementia     Diverticulosis     Essential tremor     GERD (gastroesophageal reflux disease)     Hypertension     Major depressive disorder, recurrent, severe with psychotic symptoms     Mild cognitive impairment     Mixed hyperlipidemia      Past Surgical History:   Procedure Laterality Date    APPENDECTOMY      CHOLECYSTECTOMY      HYSTERECTOMY       Family History   Problem Relation Age of Onset    Diabetes Mother     Aneurysm Mother     Heart disease Father     Hypertension Father     Aneurysm Sister     Heart disease Brother      Social History     Tobacco Use    Smoking status: Never    Smokeless tobacco: Never   Substance Use Topics    Alcohol use: Never     Review of Systems   Unable to perform ROS: Dementia   Constitutional:  Negative for fever.   HENT:          Puncture wound of the head   Respiratory:  Negative for shortness of breath.    Gastrointestinal:  Negative for diarrhea and vomiting.   Neurological:  Positive for tremors.       Physical Exam     Initial Vitals [09/11/23 0637]   BP Pulse Resp Temp SpO2   (!) 143/72 104 20 98.1 °F  (36.7 °C) 97 %      MAP       --         Physical Exam    Nursing note and vitals reviewed.  Constitutional: She appears well-developed and well-nourished. No distress.   HENT:   Mouth/Throat: Oropharynx is clear and moist.   Puncture wound of the forehead, bleeding controlled   Eyes: Conjunctivae and EOM are normal. Pupils are equal, round, and reactive to light.   Neck: No tracheal deviation present.   Questionable tenderness more to the left side   Cardiovascular:  Normal rate and regular rhythm.           Pulmonary/Chest: Breath sounds normal. No respiratory distress. She has no wheezes.   Abdominal: Abdomen is soft. Bowel sounds are normal. She exhibits no distension. There is no abdominal tenderness.   Musculoskeletal:      Comments: No pelvic tenderness, she does have some erythema of the bilateral knees but no signs of injury, they are not tender, no swelling, she does have some lateral rotation of the right foot, but she does not have any pain on the movement of the hip    She does have some tenderness in the upper thoracic area in the back but no swelling rest of the thoracic spine and lumbar spine is nontender and aligned    She does not have any evidence of any injuries to the upper limps     Neurological: She is alert. GCS score is 15. GCS eye subscore is 4. GCS verbal subscore is 5. GCS motor subscore is 6.   Skin: Skin is dry.   Psychiatric:   Flat affect         ED Course   Procedures  Labs Reviewed - No data to display       Imaging Results              CT Cervical Spine Without Contrast (Final result)  Result time 09/11/23 08:49:49      Final result by Doc Garzon MD (09/11/23 08:49:49)                   Impression:      1. Slight anterolisthesis of C4 on C5-C5 on C6  2. Cervical spondylosis  3. Osteopenia  4. Dextroconvexity of the cervical spine  5. Multilevel encroachment into the neural foramina as described  6. Heterogeneous thyroid lobes  7. Atelectasis and/or scarring right  apex      Electronically signed by: Doc Garzon  Date:    09/11/2023  Time:    08:49               Narrative:    EXAMINATION:  CT CERVICAL SPINE WITHOUT CONTRAST    CLINICAL HISTORY:  , Neck trauma (Age >= 65y);    TECHNIQUE,:  PATIENT RADIATION DOSE: DLP(mGycm) 370    As per PQRS measures, all CT scans at this facility used dose modulation, iterative reconstruction, and/or weight based dose adjustment when appropriate to reduce radiation dose to as low as reasonably achievable.    COMPARISON:  None available    FINDINGS:  Serial axial images were obtained of the cervical spine without the administration of intravenous contrast.  Additional coronal and sagittal images were performed.  Both soft tissue and bone windows were obtained. Vertebral body height is grossly intact.  There is very slight anterolisthesis of C4 on C5 and C5 on C6.  Disc space narrowing, osteophyte formation, and facet hypertrophic changes are identified.  There is motion artifact.  There is curvature of the cervical spine with the convexity to the right.  Bony structures are osteopenic.  There is no loss of integrity to the bony spinal canal.  No fracture is seen.  There is multilevel encroachment into the neural foramina secondary to posterior osteophyte formation, facet arthrosis, and poorly visualized posterior disc bulge.  Atherosclerosis is seen within the carotid arteries.  Thyroid lobes are mildly heterogeneous.  Mild atelectasis and/or scarring is evident the right apex.                                       CT Head Without Contrast (Final result)  Result time 09/11/23 08:46:39      Final result by Doc Garzon MD (09/11/23 08:46:39)                   Impression:      1. No acute intracranial abnormality identified  2. Generalized cerebral and cerebellar atrophy  3. Intracranial atherosclerosis  4. Scattered hypodensities at the periventricular white matter suspicious for small vessel ischemic changes  5. Osteopenia  6.  Hyperostosis frontalis interna  7. Suspect scalp hematoma overlying the right frontal bone      Electronically signed by: Doc Garzon  Date:    09/11/2023  Time:    08:46               Narrative:    EXAMINATION:  CT HEAD WITHOUT CONTRAST    CLINICAL HISTORY:  Facial trauma, blunt;, .    TECHNIQUE:  PATIENT RADIATION DOSE: DLP(mGycm) 960    As per PQRS measures, all CT scans at this facility used dose modulation, iterative reconstruction, and/or weight based dose adjustment when appropriate to reduce radiation dose to as low as reasonably achievable.    COMPARISON:  None available.    FINDINGS:  Serial axial images were obtained of the head without the administration of IV contrast. Both brain and bone parenchymal windows were obtained.  Additional coronal and sagittal reconstructions were obtained.  Ventricles, cisterns, and sulci are prominent size.  There is no evidence of intracranial hemorrhage, midline shift, mass effect, or abnormal extra-axial fluid collections.  Intracranial atherosclerosis is seen.  Scattered hypodensities are seen within the periventricular white matter.  Cerebellar tonsils extend caudally to the level of foramen magnum.  Bony structures are osteopenic.  Visualized paranasal sinuses and mastoid air cells are relatively clear.  External auditory canals are grossly patent.  No acute calvarial fracture is seen.  There are bony defects at the occipital bone suspicious for arachnoid granulations.  There is a suspect scalp hematoma overlying the right frontal bone.  There is beam hardening artifact at the skull base.                                       X-Ray Chest 1 View (Final result)  Result time 09/11/23 08:52:14      Final result by Doc Garzon MD (09/11/23 08:52:14)                   Impression:      1. Borderline cardiomegaly  2. Atherosclerosis  3. Thoracic spondylosis  4. Suboptimal inspiration      Electronically signed by: Doc Garzon  Date:    09/11/2023  Time:    08:52                Narrative:    EXAMINATION:  XR CHEST 1 VIEW    CLINICAL HISTORY:  , Unspecified fall, initial encounter.    COMPARISON:  11/07/2018    FINDINGS:  An AP view or more reveals the heart to be borderline enlarged.  The trachea is to the right of midline.  The patient is rotated on the exam.  Atherosclerosis is seen within the aorta.  No definite infiltrate or effusion is seen.  No pneumothorax identified.  Inspiration is less than optimal.  Bony structures are osteopenic.  Degenerative changes are noted to the thoracic spine.                        Final result by Doc Garzon MD (09/11/23 08:52:14)                   Impression:      1. Borderline cardiomegaly  2. Atherosclerosis  3. Thoracic spondylosis  4. Suboptimal inspiration      Electronically signed by: Doc Garzon  Date:    09/11/2023  Time:    08:52               Narrative:    EXAMINATION:  XR CHEST 1 VIEW    CLINICAL HISTORY:  , Unspecified fall, initial encounter.    COMPARISON:  11/07/2018    FINDINGS:  An AP view or more reveals the heart to be borderline enlarged.  The trachea is to the right of midline.  The patient is rotated on the exam.  Atherosclerosis is seen within the aorta.  No definite infiltrate or effusion is seen.  No pneumothorax identified.  Inspiration is less than optimal.  Bony structures are osteopenic.  Degenerative changes are noted to the thoracic spine.                        Final result by Doc Garzon MD (09/11/23 08:52:14)                   Impression:      1. Borderline cardiomegaly  2. Atherosclerosis  3. Thoracic spondylosis  4. Suboptimal inspiration      Electronically signed by: Doc Garzon  Date:    09/11/2023  Time:    08:52               Narrative:    EXAMINATION:  XR CHEST 1 VIEW    CLINICAL HISTORY:  , Unspecified fall, initial encounter.    COMPARISON:  11/07/2018    FINDINGS:  An AP view or more reveals the heart to be borderline enlarged.  The trachea is to the right of midline.  The  patient is rotated on the exam.  Atherosclerosis is seen within the aorta.  No definite infiltrate or effusion is seen.  No pneumothorax identified.  Inspiration is less than optimal.  Bony structures are osteopenic.  Degenerative changes are noted to the thoracic spine.                                       X-Ray Pelvis Routine AP (Final result)  Result time 09/11/23 08:51:06      Final result by Doc Garzon MD (09/11/23 08:51:06)                   Impression:      1. Limited exam secondary to patient body habitus and lack of beam penetration  2. No obvious acute osseous defect identified  3. Degenerative changes at the lumbar spine and hips bilaterally  4. Osteopenia  5. Additional views would allow further evaluation if clinically indicated      Electronically signed by: Doc Garzon  Date:    09/11/2023  Time:    08:51               Narrative:    EXAMINATION:  XR PELVIS ROUTINE AP    CLINICAL HISTORY:  Unspecified fall, initial encounter; .    COMPARISON:  None available.    FINDINGS:  Single AP view is limited secondary to patient body habitus and lack of beam penetration.  No obvious fracture is seen.  Degenerative changes at the lumbar spine the hips bilaterally.  Bony structures are osteopenic.                                       Medications - No data to display  Medical Decision Making  78 y.o. female  has a past medical history of Anxiety disorder, unspecified, Dementia, Diverticulosis, Essential tremor, GERD (gastroesophageal reflux disease), Hypertension, Major depressive disorder, recurrent, severe with psychotic symptoms, Mild cognitive impairment, and Mixed hyperlipidemia.  Fall (Pt brought in by [facility] from [facility] with c/o fall. Pt has laceration to forehead.)     Nurses at the nursing home were trying to take her out of the bed and the wheelchair when they lost control and she fell hit the head the corner of the table, they called the ambulance and sent her to the emergency room.    Amount  and/or Complexity of Data Reviewed  Radiology: ordered.               ED Course as of 09/12/23 0627   Tue Sep 12, 2023   0627 Puncture wound cleaned with peroxide and small bandage applied to the wound. Pt. Discharged back to nursing home. [GQ]      ED Course User Index  [GQ] Muriel Greer MD                    Clinical Impression:   Final diagnoses:  [W19.XXXA] Accidental fall, initial encounter  [S01.83XA] Puncture wound of forehead, initial encounter (Primary)        ED Disposition Condition    Discharge Stable          ED Prescriptions    None       Follow-up Information       Follow up With Specialties Details Why Contact Info    Carina Vargas MD Family Medicine In 1 day  [address] [facility]  [facility]  [facility] [address]  [phone]               Muriel Greer MD  09/12/23 0627

## 2023-11-08 ENCOUNTER — LAB REQUISITION (OUTPATIENT)
Dept: LAB | Facility: HOSPITAL | Age: 78
End: 2023-11-08
Payer: MEDICARE

## 2023-11-08 DIAGNOSIS — I42.9 CARDIOMYOPATHY, UNSPECIFIED: ICD-10-CM

## 2023-11-08 DIAGNOSIS — E87.20 ACIDOSIS, UNSPECIFIED: ICD-10-CM

## 2023-11-08 DIAGNOSIS — R53.1 WEAKNESS: ICD-10-CM

## 2023-11-08 LAB
ALBUMIN SERPL-MCNC: 3.9 G/DL (ref 3.4–4.8)
ALBUMIN/GLOB SERPL: 1.1 RATIO (ref 1.1–2)
ALP SERPL-CCNC: 67 UNIT/L (ref 40–150)
ALT SERPL-CCNC: 24 UNIT/L (ref 0–55)
AST SERPL-CCNC: 21 UNIT/L (ref 5–34)
BILIRUB SERPL-MCNC: 0.5 MG/DL
BUN SERPL-MCNC: 39 MG/DL (ref 9.8–20.1)
CALCIUM SERPL-MCNC: 10.4 MG/DL (ref 8.4–10.2)
CHLORIDE SERPL-SCNC: 107 MMOL/L (ref 98–107)
CO2 SERPL-SCNC: 26 MMOL/L (ref 23–31)
CREAT SERPL-MCNC: 1.26 MG/DL (ref 0.55–1.02)
ERYTHROCYTE [DISTWIDTH] IN BLOOD BY AUTOMATED COUNT: 15.3 % (ref 11.5–17)
GFR SERPLBLD CREATININE-BSD FMLA CKD-EPI: 44 MLS/MIN/1.73/M2
GLOBULIN SER-MCNC: 3.6 GM/DL (ref 2.4–3.5)
GLUCOSE SERPL-MCNC: 101 MG/DL (ref 82–115)
HCT VFR BLD AUTO: 34.3 % (ref 37–47)
HGB BLD-MCNC: 10.9 G/DL (ref 12–16)
LACTATE SERPL-SCNC: 1.3 MMOL/L (ref 0.5–2.2)
MCH RBC QN AUTO: 26.4 PG (ref 27–31)
MCHC RBC AUTO-ENTMCNC: 31.8 G/DL (ref 33–36)
MCV RBC AUTO: 83.1 FL (ref 80–94)
PLATELET # BLD AUTO: 191 X10(3)/MCL (ref 130–400)
PMV BLD AUTO: 11.6 FL (ref 7.4–10.4)
POTASSIUM SERPL-SCNC: 4.1 MMOL/L (ref 3.5–5.1)
PROT SERPL-MCNC: 7.5 GM/DL (ref 5.8–7.6)
RBC # BLD AUTO: 4.13 X10(6)/MCL (ref 4.2–5.4)
SODIUM SERPL-SCNC: 142 MMOL/L (ref 136–145)
WBC # SPEC AUTO: 8.77 X10(3)/MCL (ref 4.5–11.5)

## 2023-11-08 PROCEDURE — 83605 ASSAY OF LACTIC ACID: CPT | Performed by: FAMILY MEDICINE

## 2023-11-08 PROCEDURE — 85027 COMPLETE CBC AUTOMATED: CPT | Performed by: FAMILY MEDICINE

## 2023-11-08 PROCEDURE — 80053 COMPREHEN METABOLIC PANEL: CPT | Performed by: FAMILY MEDICINE
